# Patient Record
Sex: FEMALE | Race: WHITE | NOT HISPANIC OR LATINO
[De-identification: names, ages, dates, MRNs, and addresses within clinical notes are randomized per-mention and may not be internally consistent; named-entity substitution may affect disease eponyms.]

---

## 2017-08-31 ENCOUNTER — APPOINTMENT (OUTPATIENT)
Dept: SURGERY | Facility: CLINIC | Age: 62
End: 2017-08-31
Payer: COMMERCIAL

## 2017-08-31 PROCEDURE — 99213 OFFICE O/P EST LOW 20 MIN: CPT

## 2017-09-12 ENCOUNTER — OTHER (OUTPATIENT)
Age: 62
End: 2017-09-12

## 2018-08-16 ENCOUNTER — APPOINTMENT (OUTPATIENT)
Dept: SURGERY | Facility: CLINIC | Age: 63
End: 2018-08-16
Payer: COMMERCIAL

## 2018-08-16 PROCEDURE — 36415 COLL VENOUS BLD VENIPUNCTURE: CPT

## 2018-08-16 PROCEDURE — 99213 OFFICE O/P EST LOW 20 MIN: CPT

## 2018-08-18 LAB
T3 SERPL-MCNC: 101 NG/DL
T4 FREE SERPL-MCNC: 1.8 NG/DL
THYROGLOB AB SERPL-ACNC: <20 IU/ML
THYROPEROXIDASE AB SERPL IA-ACNC: <10 IU/ML
TSH SERPL-ACNC: 0.22 UIU/ML

## 2018-09-04 ENCOUNTER — OTHER (OUTPATIENT)
Age: 63
End: 2018-09-04

## 2019-03-25 ENCOUNTER — OTHER (OUTPATIENT)
Age: 64
End: 2019-03-25

## 2019-08-13 ENCOUNTER — APPOINTMENT (OUTPATIENT)
Dept: SURGERY | Facility: CLINIC | Age: 64
End: 2019-08-13
Payer: COMMERCIAL

## 2019-08-13 PROCEDURE — 99213 OFFICE O/P EST LOW 20 MIN: CPT

## 2019-08-13 NOTE — HISTORY OF PRESENT ILLNESS
[de-identified] : 23  years s/p total thyroidectomy by dr Kirkland for benign disease. feels tired on Synthroid 112 mcg daily. recent  TFT's normal. recent sonogram showed stable left thyroid bed 2.5 cm cystic nodule. denies dysphagia, hoarseness or new lesions.  no changes medically since last visit

## 2020-05-22 ENCOUNTER — APPOINTMENT (OUTPATIENT)
Dept: GYNECOLOGIC ONCOLOGY | Facility: CLINIC | Age: 65
End: 2020-05-22
Payer: MEDICARE

## 2020-05-22 VITALS
HEIGHT: 62 IN | WEIGHT: 161 LBS | SYSTOLIC BLOOD PRESSURE: 169 MMHG | DIASTOLIC BLOOD PRESSURE: 76 MMHG | HEART RATE: 79 BPM | BODY MASS INDEX: 29.63 KG/M2

## 2020-05-22 DIAGNOSIS — F41.9 ANXIETY DISORDER, UNSPECIFIED: ICD-10-CM

## 2020-05-22 DIAGNOSIS — Z72.0 TOBACCO USE: ICD-10-CM

## 2020-05-22 DIAGNOSIS — E78.5 HYPERLIPIDEMIA, UNSPECIFIED: ICD-10-CM

## 2020-05-22 DIAGNOSIS — I25.10 ATHEROSCLEROTIC HEART DISEASE OF NATIVE CORONARY ARTERY W/OUT ANGINA PECTORIS: ICD-10-CM

## 2020-05-22 DIAGNOSIS — Z78.9 OTHER SPECIFIED HEALTH STATUS: ICD-10-CM

## 2020-05-22 DIAGNOSIS — Z80.3 FAMILY HISTORY OF MALIGNANT NEOPLASM OF BREAST: ICD-10-CM

## 2020-05-22 DIAGNOSIS — I10 ESSENTIAL (PRIMARY) HYPERTENSION: ICD-10-CM

## 2020-05-22 DIAGNOSIS — N83.299 OTHER OVARIAN CYST, UNSPECIFIED SIDE: ICD-10-CM

## 2020-05-22 PROCEDURE — 99203 OFFICE O/P NEW LOW 30 MIN: CPT

## 2020-05-22 RX ORDER — AMLODIPINE BESYLATE 5 MG/1
TABLET ORAL
Refills: 0 | Status: ACTIVE | COMMUNITY

## 2020-05-22 RX ORDER — ATORVASTATIN CALCIUM 80 MG/1
TABLET, FILM COATED ORAL
Refills: 0 | Status: ACTIVE | COMMUNITY

## 2020-05-22 RX ORDER — BUSPIRONE HCL 5 MG
TABLET ORAL
Refills: 0 | Status: ACTIVE | COMMUNITY

## 2020-05-22 RX ORDER — ASPIRIN 81 MG
81 TABLET,CHEWABLE ORAL
Refills: 0 | Status: ACTIVE | COMMUNITY

## 2020-05-22 RX ORDER — PROPRANOLOL HYDROCHLORIDE 20 MG/1
20 TABLET ORAL
Refills: 0 | Status: ACTIVE | COMMUNITY

## 2020-05-22 RX ORDER — PAROXETINE HYDROCHLORIDE 40 MG/1
TABLET, FILM COATED ORAL
Refills: 0 | Status: ACTIVE | COMMUNITY

## 2020-05-23 LAB
CANCER AG125 SERPL-ACNC: 5 U/ML
CEA SERPL-MCNC: 1.6 NG/ML

## 2020-07-15 ENCOUNTER — APPOINTMENT (OUTPATIENT)
Dept: ULTRASOUND IMAGING | Facility: IMAGING CENTER | Age: 65
End: 2020-07-15
Payer: MEDICARE

## 2020-07-15 ENCOUNTER — OUTPATIENT (OUTPATIENT)
Dept: OUTPATIENT SERVICES | Facility: HOSPITAL | Age: 65
LOS: 1 days | End: 2020-07-15
Payer: MEDICARE

## 2020-07-15 DIAGNOSIS — N83.299 OTHER OVARIAN CYST, UNSPECIFIED SIDE: ICD-10-CM

## 2020-07-15 PROCEDURE — 76830 TRANSVAGINAL US NON-OB: CPT | Mod: 26

## 2020-07-15 PROCEDURE — 76856 US EXAM PELVIC COMPLETE: CPT

## 2020-07-15 PROCEDURE — 76856 US EXAM PELVIC COMPLETE: CPT | Mod: 26

## 2020-07-15 PROCEDURE — 76830 TRANSVAGINAL US NON-OB: CPT

## 2020-08-10 ENCOUNTER — APPOINTMENT (OUTPATIENT)
Dept: ULTRASOUND IMAGING | Facility: CLINIC | Age: 65
End: 2020-08-10

## 2020-08-11 ENCOUNTER — APPOINTMENT (OUTPATIENT)
Dept: GYNECOLOGIC ONCOLOGY | Facility: CLINIC | Age: 65
End: 2020-08-11
Payer: MEDICARE

## 2020-08-11 VITALS
HEART RATE: 80 BPM | BODY MASS INDEX: 29.26 KG/M2 | WEIGHT: 159 LBS | DIASTOLIC BLOOD PRESSURE: 81 MMHG | SYSTOLIC BLOOD PRESSURE: 181 MMHG | HEIGHT: 62 IN

## 2020-08-11 DIAGNOSIS — Z80.3 FAMILY HISTORY OF MALIGNANT NEOPLASM OF BREAST: ICD-10-CM

## 2020-08-11 DIAGNOSIS — Z80.7 FAMILY HISTORY OF OTHER MALIGNANT NEOPLASMS OF LYMPHOID, HEMATOPOIETIC AND RELATED TISSUES: ICD-10-CM

## 2020-08-11 DIAGNOSIS — Z80.8 FAMILY HISTORY OF MALIGNANT NEOPLASM OF OTHER ORGANS OR SYSTEMS: ICD-10-CM

## 2020-08-11 DIAGNOSIS — Z80.52 FAMILY HISTORY OF MALIGNANT NEOPLASM OF BLADDER: ICD-10-CM

## 2020-08-11 DIAGNOSIS — Z80.9 FAMILY HISTORY OF MALIGNANT NEOPLASM, UNSPECIFIED: ICD-10-CM

## 2020-08-11 DIAGNOSIS — Z80.49 FAMILY HISTORY OF MALIGNANT NEOPLASM OF OTHER GENITAL ORGANS: ICD-10-CM

## 2020-08-11 PROCEDURE — XXXXX: CPT

## 2020-10-13 ENCOUNTER — APPOINTMENT (OUTPATIENT)
Dept: SURGERY | Facility: CLINIC | Age: 65
End: 2020-10-13
Payer: MEDICARE

## 2020-10-13 PROCEDURE — 36415 COLL VENOUS BLD VENIPUNCTURE: CPT

## 2020-10-13 PROCEDURE — 99213 OFFICE O/P EST LOW 20 MIN: CPT

## 2020-10-13 NOTE — HISTORY OF PRESENT ILLNESS
[de-identified] : 24  years s/p total thyroidectomy by dr Kirkland for benign disease. feels tired on Synthroid 112 mcg daily.  recent sonogram showed stable left thyroid bed 2.5 cm cystic nodule. denies dysphagia, hoarseness or new lesions.  no changes medically since last visit

## 2020-10-13 NOTE — PHYSICAL EXAM
[de-identified] : well healed scar [de-identified] : no change in 2 cm left thyroid bed nodule, well circumscribed and mobile [Laryngoscopy Performed] : laryngoscopy was performed, see procedure section for findings [Midline] : located in midline position [Normal] : orientation to person, place, and time: normal

## 2020-10-13 NOTE — ASSESSMENT
[FreeTextEntry1] : will observe.  sonogram next visit. to return earlier if any change.  bloods drawn. to call next week for results.

## 2020-10-14 LAB
T3 SERPL-MCNC: 93 NG/DL
T4 FREE SERPL-MCNC: 1.6 NG/DL
TSH SERPL-ACNC: 0.68 UIU/ML

## 2020-10-15 LAB
THYROGLOB AB SERPL-ACNC: <20 IU/ML
THYROPEROXIDASE AB SERPL IA-ACNC: <10 IU/ML

## 2020-12-09 NOTE — PHYSICAL EXAM
[de-identified] : no change in 2 cm left thyroid bed nodule, well circumscribed and mobile yes [de-identified] : well healed scar [Laryngoscopy Performed] : laryngoscopy was performed, see procedure section for findings [Midline] : located in midline position [Normal] : no neck adenopathy

## 2021-08-05 ENCOUNTER — APPOINTMENT (OUTPATIENT)
Dept: SURGERY | Facility: CLINIC | Age: 66
End: 2021-08-05

## 2021-08-17 ENCOUNTER — APPOINTMENT (OUTPATIENT)
Dept: SURGERY | Facility: CLINIC | Age: 66
End: 2021-08-17
Payer: MEDICARE

## 2021-08-17 DIAGNOSIS — G43.909 MIGRAINE, UNSPECIFIED, NOT INTRACTABLE, W/OUT STATUS MIGRAINOSUS: ICD-10-CM

## 2021-08-17 PROCEDURE — 99214 OFFICE O/P EST MOD 30 MIN: CPT

## 2021-08-17 NOTE — PHYSICAL EXAM
[de-identified] : well healed scar [de-identified] : no change in 2 cm left thyroid bed nodule, well circumscribed and mobile [Laryngoscopy Performed] : laryngoscopy was performed, see procedure section for findings [Midline] : located in midline position [Normal] : orientation to person, place, and time: normal

## 2021-08-17 NOTE — ASSESSMENT
[FreeTextEntry1] : will observe.  sonogram next visit. to return earlier if any change.  bloods and adjustment of Synthroid per dr Daniels. to inquire about use of magnesium and riboflavin for treatment of migraines.  patient has been given the opportunity to ask questions, and all of the patient's questions have been answered to their satisfaction

## 2021-08-17 NOTE — HISTORY OF PRESENT ILLNESS
[de-identified] : 25  years s/p total thyroidectomy by dr Kirkland for benign disease. feels well on Synthroid 100 mcg daily.  recent sonogram showed stable left thyroid bed 2.5 cm cystic nodule. denies dysphagia, hoarseness or new lesions.  no changes medically since last visit  with exception of treatment for chronic migraine headaches. I have reviewed all old and new data and available images.\par

## 2022-12-01 ENCOUNTER — APPOINTMENT (OUTPATIENT)
Dept: SURGERY | Facility: CLINIC | Age: 67
End: 2022-12-01

## 2022-12-01 PROCEDURE — 99214 OFFICE O/P EST MOD 30 MIN: CPT

## 2022-12-01 RX ORDER — PAROXETINE HYDROCHLORIDE 10 MG/1
10 TABLET, FILM COATED ORAL
Qty: 90 | Refills: 0 | Status: ACTIVE | COMMUNITY
Start: 2022-03-02

## 2022-12-01 RX ORDER — BUSPIRONE HYDROCHLORIDE 10 MG/1
10 TABLET ORAL
Qty: 90 | Refills: 0 | Status: ACTIVE | COMMUNITY
Start: 2021-09-02

## 2022-12-01 RX ORDER — PROPRANOLOL HYDROCHLORIDE 40 MG/1
40 TABLET ORAL
Qty: 90 | Refills: 0 | Status: ACTIVE | COMMUNITY
Start: 2022-09-22

## 2022-12-01 RX ORDER — GABAPENTIN 100 MG/1
100 CAPSULE ORAL
Qty: 90 | Refills: 0 | Status: ACTIVE | COMMUNITY
Start: 2022-11-11

## 2022-12-01 NOTE — HISTORY OF PRESENT ILLNESS
[de-identified] : 27  years s/p total thyroidectomy by dr Kirkland for benign disease. feels well on Synthroid 75 mcg daily.  recent sonogram showed essentially stable left thyroid bed 2.5 cm cystic nodule. denies dysphagia, hoarseness or new lesions.  no changes medically since last visit  . I have reviewed all old and new data and available images.  Additional information was obtained from others present at the time of visit to ensure the completeness of the history\par \par

## 2022-12-01 NOTE — PHYSICAL EXAM
[de-identified] : well healed scar [de-identified] : no change in 2 cm left thyroid bed nodule, well circumscribed and mobile [Laryngoscopy Performed] : laryngoscopy was performed, see procedure section for findings [Midline] : located in midline position [Normal] : orientation to person, place, and time: normal

## 2022-12-01 NOTE — ASSESSMENT
[FreeTextEntry1] : will observe.  sonogram next visit. to return earlier if any change.  bloods and adjustment of Synthroid per dr Daniels.   patient has been given the opportunity to ask questions, and all of the patient's questions have been answered to their satisfaction

## 2023-12-05 ENCOUNTER — APPOINTMENT (OUTPATIENT)
Dept: SURGERY | Facility: CLINIC | Age: 68
End: 2023-12-05
Payer: MEDICARE

## 2023-12-05 DIAGNOSIS — E04.1 NONTOXIC SINGLE THYROID NODULE: ICD-10-CM

## 2023-12-05 PROCEDURE — 36415 COLL VENOUS BLD VENIPUNCTURE: CPT

## 2023-12-05 PROCEDURE — 99214 OFFICE O/P EST MOD 30 MIN: CPT | Mod: 25

## 2023-12-06 LAB
T3 SERPL-MCNC: 85 NG/DL
T4 FREE SERPL-MCNC: 1.5 NG/DL
TSH SERPL-ACNC: 2.51 UIU/ML

## 2023-12-07 LAB
THYROGLOB AB SERPL-ACNC: <20 IU/ML
THYROPEROXIDASE AB SERPL IA-ACNC: <10 IU/ML

## 2024-12-05 ENCOUNTER — LABORATORY RESULT (OUTPATIENT)
Age: 69
End: 2024-12-05

## 2024-12-05 ENCOUNTER — APPOINTMENT (OUTPATIENT)
Dept: SURGERY | Facility: CLINIC | Age: 69
End: 2024-12-05
Payer: MEDICARE

## 2024-12-05 PROCEDURE — 10005 FNA BX W/US GDN 1ST LES: CPT

## 2024-12-05 PROCEDURE — 36415 COLL VENOUS BLD VENIPUNCTURE: CPT

## 2024-12-05 PROCEDURE — 99214 OFFICE O/P EST MOD 30 MIN: CPT | Mod: 25

## 2024-12-06 LAB
T3 SERPL-MCNC: 80 NG/DL
T4 FREE SERPL-MCNC: 1.5 NG/DL
THYROGLOB AB SERPL-ACNC: <15 IU/ML
THYROPEROXIDASE AB SERPL IA-ACNC: <9 IU/ML
TSH SERPL-ACNC: 1.27 UIU/ML

## 2024-12-09 DIAGNOSIS — E04.1 NONTOXIC SINGLE THYROID NODULE: ICD-10-CM

## 2025-09-19 ENCOUNTER — APPOINTMENT (OUTPATIENT)
Dept: OBGYN | Facility: CLINIC | Age: 70
End: 2025-09-19
Payer: MEDICARE

## 2025-09-19 ENCOUNTER — NON-APPOINTMENT (OUTPATIENT)
Age: 70
End: 2025-09-19

## 2025-09-19 VITALS
BODY MASS INDEX: 25.03 KG/M2 | HEIGHT: 62 IN | DIASTOLIC BLOOD PRESSURE: 73 MMHG | WEIGHT: 136 LBS | SYSTOLIC BLOOD PRESSURE: 157 MMHG

## 2025-09-19 DIAGNOSIS — Z78.0 ASYMPTOMATIC MENOPAUSAL STATE: ICD-10-CM

## 2025-09-19 DIAGNOSIS — R92.30 DENSE BREASTS, UNSPECIFIED: ICD-10-CM

## 2025-09-19 DIAGNOSIS — Z12.31 ENCOUNTER FOR SCREENING MAMMOGRAM FOR MALIGNANT NEOPLASM OF BREAST: ICD-10-CM

## 2025-09-19 DIAGNOSIS — Z01.419 ENCOUNTER FOR GYNECOLOGICAL EXAMINATION (GENERAL) (ROUTINE) W/OUT ABNORMAL FINDINGS: ICD-10-CM

## 2025-09-19 PROCEDURE — 99202 OFFICE O/P NEW SF 15 MIN: CPT | Mod: 25

## 2025-09-19 PROCEDURE — G0101: CPT

## 2025-09-19 PROCEDURE — G0328 FECAL BLOOD SCRN IMMUNOASSAY: CPT | Mod: QW

## 2025-09-19 PROCEDURE — G0444 DEPRESSION SCREEN ANNUAL: CPT | Mod: 59

## 2025-09-22 LAB — HPV HIGH+LOW RISK DNA PNL CVX: NOT DETECTED

## 2025-09-24 LAB — CYTOLOGY CVX/VAG DOC THIN PREP: ABNORMAL
